# Patient Record
Sex: FEMALE | Race: WHITE | NOT HISPANIC OR LATINO | Employment: STUDENT | URBAN - METROPOLITAN AREA
[De-identification: names, ages, dates, MRNs, and addresses within clinical notes are randomized per-mention and may not be internally consistent; named-entity substitution may affect disease eponyms.]

---
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
## 2025-01-03 ENCOUNTER — TREATMENT (OUTPATIENT)
Dept: PHYSICAL THERAPY | Facility: HOSPITAL | Age: 20
End: 2025-01-03
Payer: COMMERCIAL

## 2025-01-03 DIAGNOSIS — M25.851 FEMOROACETABULAR IMPINGEMENT OF BOTH HIPS: ICD-10-CM

## 2025-01-03 DIAGNOSIS — Z47.89 ORTHOPEDIC AFTERCARE: ICD-10-CM

## 2025-01-03 DIAGNOSIS — M25.852 FEMOROACETABULAR IMPINGEMENT OF BOTH HIPS: ICD-10-CM

## 2025-01-03 DIAGNOSIS — M25.552 LEFT HIP PAIN: ICD-10-CM

## 2025-01-03 PROCEDURE — 97110 THERAPEUTIC EXERCISES: CPT | Mod: GP

## 2025-01-03 PROCEDURE — 97140 MANUAL THERAPY 1/> REGIONS: CPT | Mod: GP

## 2025-01-03 NOTE — PROGRESS NOTES
"  Physical Therapy  Physical Therapy Treatment Note    Patient Name: Ashley Fisher  MRN: [id number]  Today's Date: 1/3/2025  Time Calculation  Start Time: 1225  Stop Time: 1335  Time Calculation (min): 70 min    Insurance:  Visit number: 2 of MN (8 in 2024)   Authorization info: no auth needed  Insurance Type: [place]- no vaso    General:  Reason for visit: s/p L Acetabular labral debridement only as the labrum was not unstable thus did not require fixation formally , Intra-articular loose body removal, Femoral osteochondroplasty for CAM lesion, Capsular plication on 11/26/24.   Referred by: Dr. Moran  School: [employer]   Sport:  Running recreationally   POW: 5    Current Problem  1. Femoroacetabular impingement of both hips  Follow Up In Physical Therapy      2. Left hip pain  Follow Up In Physical Therapy      3. Orthopedic aftercare  Follow Up In Physical Therapy            Precautions: No active ER> 20 degrees x 3 weeks and Avoid hip flexion isometrics x 4 weeks, 50% WB until follow up, brace x 2 weeks       Subjective:     Patient reports hip is feeling good- no complaints entering clinic.     Pain   0/10     Performing HEP?: Yes      Objective:   Amb into clinic carrying her crutch -has not been using it at home.      ROM     Hip PROM (Degrees) 12/12/24                             (R)                    (L)  Flexion:            90                     130           Extension:        neutral             neutral   Abduction:       40                     40 deg  Adduction:       NT                    N   ER:                    50                     50  IR:                     32                     45    Treatment Performed:    Therapeutic Exercise:    40 min  Upright bike x 5 minutes  TA with biofeedback 10 x5\" -NT  Bridge with hip abd 2 x 10 5\"   BKFO 2 x 10 with biofeedback -no biofeedback today  Quad rock backs 2x15-hep  Prone ER/IR RTB 2 x 10 5\" holds  Glute med hip abd at wall 2 x 6 5\" hold " "  CKC OTB hip flexion/abd/ext 2 x 10   Clamshell 3 x 8 RTB  Prone plank 3 x 30\"  Side plank 3 x 20\"   NT  Side stepping 3 x 5 steps   1/2 kneeling hip flexor stretch 3 x 30\"   Prone ER/IR 2 x 10   S/L hip abduction 3 x 8   S/l hip extension 3 x 8   Prone quad stretch with 1/2 bolster 3 x 30\"  SLS 10 x 10\" L LE         Manual Therapy:    20 min  PROM L hip   Belted MWM IR, LAD   Hip scooping mob  STM/DTM L iliacus, rectus femoris, adductors, gluts, TFL     Neuromuscular Re-education:  5 min-NT  Attempted biofeedback but machine was malfunctioning. From reps completed- pt unable to active her surgical glute max as well as non surgical     Other:      UNBILLED  ThermX 34 degrees low compression in prone x 10 mins     PT Therapeutic Procedures Time Entry  Manual Therapy Time Entry: 20  Therapeutic Exercise Time Entry: 40              Non-Billable Time  Non-billable time: 10  Non-billable time reason: ice   Assessment:   Patient able to progress muscle activation and strengthening exercises today during session without increased pain. Focused on deep stabilizer activation and WB glute med activation. Mirror feedback utilized to prevent lateral trunk lean with standing hip abduction when weight bearing on surgical limb. No gait deviations noted today in clinic.       Plan:  Continue progressing through protocol.     Shannon Gaines, PT  "

## 2025-01-06 ENCOUNTER — TREATMENT (OUTPATIENT)
Dept: PHYSICAL THERAPY | Facility: HOSPITAL | Age: 20
End: 2025-01-06
Payer: COMMERCIAL

## 2025-01-06 ENCOUNTER — APPOINTMENT (OUTPATIENT)
Dept: ORTHOPEDIC SURGERY | Facility: HOSPITAL | Age: 20
End: 2025-01-06
Payer: COMMERCIAL

## 2025-01-06 ENCOUNTER — APPOINTMENT (OUTPATIENT)
Dept: PEDIATRICS | Facility: CLINIC | Age: 20
End: 2025-01-06
Payer: COMMERCIAL

## 2025-01-06 ENCOUNTER — OFFICE VISIT (OUTPATIENT)
Dept: ORTHOPEDIC SURGERY | Facility: HOSPITAL | Age: 20
End: 2025-01-06
Payer: COMMERCIAL

## 2025-01-06 DIAGNOSIS — M25.552 LEFT HIP PAIN: ICD-10-CM

## 2025-01-06 DIAGNOSIS — S73.192D ACETABULAR LABRUM TEAR, LEFT, SUBSEQUENT ENCOUNTER: Primary | ICD-10-CM

## 2025-01-06 DIAGNOSIS — Z47.89 ORTHOPEDIC AFTERCARE: ICD-10-CM

## 2025-01-06 DIAGNOSIS — M25.851 FEMOROACETABULAR IMPINGEMENT OF BOTH HIPS: ICD-10-CM

## 2025-01-06 DIAGNOSIS — M25.852 FEMOROACETABULAR IMPINGEMENT OF BOTH HIPS: ICD-10-CM

## 2025-01-06 PROCEDURE — 99211 OFF/OP EST MAY X REQ PHY/QHP: CPT | Performed by: SPECIALIST/TECHNOLOGIST

## 2025-01-06 PROCEDURE — 1036F TOBACCO NON-USER: CPT | Performed by: SPECIALIST/TECHNOLOGIST

## 2025-01-06 PROCEDURE — 97110 THERAPEUTIC EXERCISES: CPT | Mod: GP

## 2025-01-06 PROCEDURE — 97140 MANUAL THERAPY 1/> REGIONS: CPT | Mod: GP

## 2025-01-06 NOTE — PROGRESS NOTES
Physical Therapy  Physical Therapy Treatment Note    Patient Name: Ashley Fisher  MRN: [id number]  Today's Date: 1/6/2025  Time Calculation  Start Time: 0955  Stop Time: 1110  Time Calculation (min): 75 min    Insurance:  Visit number: 3 of MN (8 in 2024)   Authorization info: no auth needed  Insurance Type: [place]- No Vaso    General:  Reason for visit: s/p L Acetabular labral debridement only as the labrum was not unstable thus did not require fixation formally , Intra-articular loose body removal, Femoral osteochondroplasty for CAM lesion, Capsular plication on 11/26/24.   Referred by: Dr. Moran  School: [employer]   Sport:  Running recreationally   POW: 5    Current Problem  1. Femoroacetabular impingement of both hips  Follow Up In Physical Therapy      2. Left hip pain  Follow Up In Physical Therapy      3. Orthopedic aftercare  Follow Up In Physical Therapy          Precautions: No active ER> 20 degrees x 3 weeks and Avoid hip flexion isometrics x 4 weeks, 50% WB until follow up, brace x 2 weeks       Subjective:     Patient reports she slipped in the snow yesterday and felt a pull in her L hip- caused muscle spasms last night and is a bit sore today.     Pain   4/10     Performing HEP?: Yes      Objective:   Amb into clinic carrying her crutch -has not been using it at home.      ROM     Hip PROM (Degrees) 12/12/24                             (R)                    (L)  Flexion:            90                     130           Extension:        neutral             neutral   Abduction:       40                     35 deg  Adduction:       NT                    N   ER:                    50                     50  IR:                     32                     29 entering clinic, 40 deg after manual     Treatment Performed:    Therapeutic Exercise:    35 min  Upright bike x 5 minutes  Bridge with ER 5 x 10   SL bridge 2 x 8   Side stepping and monster walks OTB 2 x 10 yards   Jump  level 4  "3 x 10 one yellow  BKFO 2 x 10 with biofeedback -no biofeedback today  Prone ER/IR RTB 2 x 10 5\" holds  Glute med hip abd at wall 2 x 6 5\" hold   CKC OTB hip flexion/abd/ext 2 x 10   Clamshell 3 x 8 RTB  SLS with mini squat band medial and lateral knee 2 x 5 each   Prone plank 3 x 30\"  1/2 kneeling hip flexor stretch 3 x 30\"     NT  TA with biofeedback 10 x5\" -NT  Quad rock backs 2x15-hep  Prone ER/IR 2 x 10   Side plank 3 x 20\" _NT  S/L hip abduction 3 x 8   S/l hip extension 3 x 8   Prone quad stretch with 1/2 bolster 3 x 30\"  SLS 10 x 10\" L LE         Manual Therapy:    25 min  Belted MWM IR, caudal, lateral    Hip scooping mob  STM/DTM L iliacus, rectus femoris, adductors, gluts, TFL     Neuromuscular Re-education:  5 min-NT  Attempted biofeedback but machine was malfunctioning. From reps completed- pt unable to active her surgical glute max as well as non surgical     Other:      UNBILLED  ThermX 34 degrees low compression in prone x 10 mins     PT Therapeutic Procedures Time Entry  Manual Therapy Time Entry: 25  Therapeutic Exercise Time Entry: 35              Non-Billable Time  Non-billable time: 15  Non-billable time reason: ice   Assessment:   Increased tone noted in her L adductors and proximal rectus today, increased time spent on manual therapy which reduced tone and symptoms in hip. The focus of today's session was strengthening and flexibility/ROM . Patient appropriately challenged by therapeutic exercise and was able to complete without increased pain. The patient is still limited in overall strength, flexibility, motor control and pain  at this time. Patient progressing well overall with therapy and continues to require skilled care to address motor control, strength, flexibility and functional deficits.         Plan:  Continue progressing through protocol. Pt last session is Wednesday due to returning to school in [place]- will update HEP and provide with protocol.     Shannon Gaines, PT  "

## 2025-01-07 NOTE — PROGRESS NOTES
The patient returns, with her mother, 6 weeks out from their Left hip arthroscopy on 11/26/2024.  Overall, she is doing fine.  Denies adverse events or issues since her last visit.  She states that her hip fatigues quickly with prolonged walking.  She denies pain.  She continues with formal physical therapy and is progressing nicely.  Her range of motion is progressing well.  She leaves for school this Thursday in [facility] [facility] [place].      She is doing quite well 6 weeks out from her left hip arthroscopy.  Her range of motion is symmetric.  She denies pain.  She is arranging physical therapy while at school.  We highly encouraged her physical therapist here speaking with the physical therapist near school.  I encouraged her to continue icing her hip 15 to 20 minutes at a time 2-3 times per day.  Encouraged her to continue with her home exercise program and stretching routine over the first 6 weeks as she transitions to more strengthening.  She will follow-up in 6 weeks virtually.  They are in agreement the plan.  Their questions are answered.      Dr. Francesco Moran met with and examined the patient as well and formulated the treatment plan.      Mendoza Palacio PA-C

## 2025-01-08 ENCOUNTER — APPOINTMENT (OUTPATIENT)
Dept: PEDIATRICS | Facility: CLINIC | Age: 20
End: 2025-01-08
Payer: COMMERCIAL

## 2025-01-08 ENCOUNTER — TREATMENT (OUTPATIENT)
Dept: PHYSICAL THERAPY | Facility: HOSPITAL | Age: 20
End: 2025-01-08
Payer: COMMERCIAL

## 2025-01-08 VITALS — TEMPERATURE: 98.8 F | WEIGHT: 154 LBS | BODY MASS INDEX: 22.74 KG/M2

## 2025-01-08 DIAGNOSIS — M25.852 FEMOROACETABULAR IMPINGEMENT OF BOTH HIPS: ICD-10-CM

## 2025-01-08 DIAGNOSIS — M25.552 LEFT HIP PAIN: ICD-10-CM

## 2025-01-08 DIAGNOSIS — D50.9 IRON DEFICIENCY ANEMIA, UNSPECIFIED IRON DEFICIENCY ANEMIA TYPE: Primary | ICD-10-CM

## 2025-01-08 DIAGNOSIS — Z47.89 ORTHOPEDIC AFTERCARE: ICD-10-CM

## 2025-01-08 DIAGNOSIS — M25.851 FEMOROACETABULAR IMPINGEMENT OF BOTH HIPS: ICD-10-CM

## 2025-01-08 PROCEDURE — 99213 OFFICE O/P EST LOW 20 MIN: CPT | Performed by: PEDIATRICS

## 2025-01-08 PROCEDURE — 97110 THERAPEUTIC EXERCISES: CPT | Mod: GP

## 2025-01-08 PROCEDURE — 97140 MANUAL THERAPY 1/> REGIONS: CPT | Mod: GP

## 2025-01-08 PROCEDURE — G2211 COMPLEX E/M VISIT ADD ON: HCPCS | Performed by: PEDIATRICS

## 2025-01-08 NOTE — PATIENT INSTRUCTIONS
When taking iron supplements, avoid delay/slow release formulations and instead take Ferrous gluconate or Ferrous Fumarate which are available over the counter.    Take one pill daily for 2 months, then change to a daily women's multivitamin.    Suggestions for taking iron:     Avoid enteric coated or timed-release products  Take separately from foods that may impair absorption  Meals  Coffee  Eggs  Oxalates (spinach, kale, beets, nuts, chocolate, tea, wheat bran, rhubarb, strawberries, some herbs)  Phytates (soy, fiber, cereals, some nuts, beans, lentils, peas)  Tannates (tea, cocoa, some spices, some berries)  Calcium (milk, yogurt, cheese, some greens, fish)  Minimize exposure to medications that decrease gastric acidity  Antacids (take iron 2 hours before or 4 hours after the antacid)  Histamine receptor blockers (discontinue if no longer needed)  Proton pump inhibitors (discontinue if no longer needed)  Take with foods/supplements that may increase absorption  Vitamin C  Orange juice   Improve tolerability*      Change from every-day to every-other-day dosing  Dietary modifications (eg, take with food)?  Take a product with less elemental iron?  Use a stool softener or laxative

## 2025-01-08 NOTE — PROGRESS NOTES
Physical Therapy  Physical Therapy Treatment Note    Patient Name: Ashley Fisher  MRN: [id number]  Today's Date: 1/8/2025  Time Calculation  Start Time: 0950  Stop Time: 1105  Time Calculation (min): 75 min    Insurance:  Visit number: 4 of MN (8 in 2024)   Authorization info: no auth needed  Insurance Type: [place]- No Vaso    General:  Reason for visit: s/p L Acetabular labral debridement only as the labrum was not unstable thus did not require fixation formally , Intra-articular loose body removal, Femoral osteochondroplasty for CAM lesion, Capsular plication on 11/26/24.   Referred by: Dr. Moran  School: [employer]   Sport:  Running recreationally   POW: 6    Current Problem  1. Femoroacetabular impingement of both hips  Follow Up In Physical Therapy      2. Left hip pain  Follow Up In Physical Therapy      3. Orthopedic aftercare  Follow Up In Physical Therapy          Precautions: No active ER> 20 degrees x 3 weeks and Avoid hip flexion isometrics x 4 weeks, 50% WB until follow up, brace x 2 weeks       Subjective:     Patient reports MD visit went well. Has PT set up at school for next Tuesday evaluation. Will be in contact with PT regarding her care. Pt reports her hip is feeling much better today from previous session.     Pain   0/10     Performing HEP?: Yes      Objective:   Amb into clinic carrying her crutch -has not been using it.      ROM     Hip PROM (Degrees) 12/12/24                             (R)                    (L)  Flexion:            90                     135           Extension:        neutral             neutral   Abduction:       40                     40 deg  Adduction:       NT                    N   ER:                    50                     50  IR:                     32                     42    Treatment Performed:    Therapeutic Exercise:    45 min  Upright bike x 5 minutes  Bridge with ER 5 x 10   SL bridge 3 x 8   Standing clamshells RTB 2 x 10   S/l hip add/abd  "2 x 10   Side stepping and monster walks OTB 2 x 10 yards   Jump  level 4 3 x 10 one yellow  Heel taps 6\" 3 x 10   Birddogs 2 x 10 5\" hold   Prone plank 3 x 30\"  Side plank 3 x 15 \"   BKFO 2 x 10 with biofeedback -no biofeedback today  Prone ER/IR RTB 2 x 10 5\" holds  Glute med hip abd at wall 2 x 6 5\" hold   CKC OTB hip flexion/abd/ext 2 x 10 -NT  SLS with mini squat band medial and lateral knee 2 x 5 each   1/2 kneeling hip flexor stretch 3 x 30\"     NT  TA with biofeedback 10 x5\" -NT  Quad rock backs 2x15-hep  Prone ER/IR 2 x 10   Side plank 3 x 20\" _NT  S/L hip abduction 3 x 8   S/l hip extension 3 x 8   Prone quad stretch with 1/2 bolster 3 x 30\"  SLS 10 x 10\" L LE         Manual Therapy:    15 min  Belted MWM IR, caudal, lateral    STM/DTM L iliacus, rectus femoris, adductors, gluts, TFL     Neuromuscular Re-education:  5 min-NT  Attempted biofeedback but machine was malfunctioning. From reps completed- pt unable to active her surgical glute max as well as non surgical     Other:      UNBILLED  ThermX 34 degrees low compression in prone x 10 mins     PT Therapeutic Procedures Time Entry  Manual Therapy Time Entry: 15  Therapeutic Exercise Time Entry: 45              Non-Billable Time  Non-billable time: 10  Non-billable time reason: ice   Assessment:   Updated patients home exercise program for her as she returns to school tomorrow. Pt demonstrated understanding of all exercises for home program and is already set up for PT. Will reach out to discuss with them regarding Dr. Johnson protocol. Patient doing very well for 6 weeks out of surgery and anticipate she will continue to do well with guided therapy.       Plan:  Discharge as patient is returning to school.     Shannon Gaines, PT  "

## 2025-01-08 NOTE — PROGRESS NOTES
Ashley Fisher is a 19 y.o. female who presents for Labs Only.    KATHY Ramirez is here today to discuss issues with her labs done routinely for her hip surgery 6 weeks ago.  She was noted to hae a slight decrease in her hemoglobin at that time.  About 6 months ago her hgb was normal, but her Ferritin was 17 which is at the low end of normal.  Menstrual periods are regular.  Not on birth control.  No fatigue or other symptoms.  She is in school in [place] -- [employer].    Objective   Temp 37.1 °C (98.8 °F)   Wt 69.9 kg (154 lb)   BMI 22.74 kg/m²     Physical Exam  Gen: well appearing    Assessment/Plan   Ashley has mild anemia on her last cbc which I suspect is from iron deficiency.  She is asymptomatic.  I recommended she start on daily iron (info given) for 2 months.  Because she will be at school until May she will continue on a daily women's MVT after that until she comes home and gets her cbc/ferritin re-drawn (order now in Epic).  She will call sooner with any concerns.

## 2025-01-10 DIAGNOSIS — R09.81 NASAL CONGESTION: ICD-10-CM

## 2025-01-10 RX ORDER — FLUTICASONE PROPIONATE 50 MCG
2 SPRAY, SUSPENSION (ML) NASAL DAILY PRN
Qty: 48 ML | Refills: 1 | Status: SHIPPED | OUTPATIENT
Start: 2025-01-10

## 2025-01-10 NOTE — TELEPHONE ENCOUNTER
Bed: [facility] 02 - A Chair  Expected date: 11/19/24  Expected time:   Means of arrival:   Comments:   Sent from pharm, last [facility] 6/14/24

## 2025-02-17 ENCOUNTER — OFFICE VISIT (OUTPATIENT)
Dept: ORTHOPEDIC SURGERY | Facility: HOSPITAL | Age: 20
End: 2025-02-17
Payer: COMMERCIAL

## 2025-02-17 DIAGNOSIS — S73.192D ACETABULAR LABRUM TEAR, LEFT, SUBSEQUENT ENCOUNTER: Primary | ICD-10-CM

## 2025-02-17 PROCEDURE — 99211 OFF/OP EST MAY X REQ PHY/QHP: CPT | Performed by: SPECIALIST/TECHNOLOGIST

## 2025-02-17 PROCEDURE — 1036F TOBACCO NON-USER: CPT | Performed by: SPECIALIST/TECHNOLOGIST

## 2025-02-17 NOTE — PROGRESS NOTES
The patient returns, via phone, 12 weeks out from their Left hip arthroscopy on 11/26/2024.  Overall she is doing well.  Denies problems or issues since her last visit.  She continues with formal physical therapy while at school twice weekly and has not had any difficulties.  She has not began her jumping or running program to this point and has been focusing solely on strengthening and continued flexibility.  She denies pain with her day-to-day activities.    The patient and I discussed her hip 12 weeks status post left hip arthroscopy.  Per her report she is doing quite well.  She denies pain.  She denies issues.  I feel at this point that it is okay for her to begin her plyometric and walk jog program portion of the postoperative rehab.  She asked if she is ready to swim in, and I feel at this point she is not ready to do so until after she has passed her return to sport test.  She will have her physical therapist at school reach out to her physical therapist here in [place] for the return to sport test.  She will follow-up at the beginning of May when she returns back home from the semester.  She is in agreement the plan.  Questions are answered.      Mendoza Palacio PA-C

## 2025-02-17 NOTE — ANESTHESIA PROCEDURE NOTES
Airway  Date/Time: 11/26/2024 7:19 AM  Urgency: elective    Airway not difficult    Staffing  Performed: SRNA   Authorized by: Kevin Wellington MD    Performed by: ANAY Kimbrough-DEMARCUS  Patient location during procedure: OR    Indications and Patient Condition  Indications for airway management: anesthesia  Spontaneous ventilation: present  Sedation level: deep  Preoxygenated: yes  Patient position: sniffing  Mask difficulty assessment: 1 - vent by mask    Final Airway Details  Final airway type: endotracheal airway      Successful airway: ETT  Cuffed: yes   Successful intubation technique: direct laryngoscopy  Blade: Jose  Blade size: #3  ETT size (mm): 7.0  Cormack-Lehane Classification: grade I - full view of glottis  Placement verified by: capnometry   Measured from: lips  ETT to lips (cm): 22  Number of attempts at approach: 1           Female

## 2025-05-09 ENCOUNTER — OFFICE VISIT (OUTPATIENT)
Dept: ORTHOPEDIC SURGERY | Facility: HOSPITAL | Age: 20
End: 2025-05-09
Payer: COMMERCIAL

## 2025-05-09 ENCOUNTER — HOSPITAL ENCOUNTER (OUTPATIENT)
Dept: RADIOLOGY | Facility: HOSPITAL | Age: 20
Discharge: HOME | End: 2025-05-09
Payer: COMMERCIAL

## 2025-05-09 DIAGNOSIS — M25.551 RIGHT HIP PAIN: ICD-10-CM

## 2025-05-09 DIAGNOSIS — M25.851 FEMOROACETABULAR IMPINGEMENT OF RIGHT HIP: Primary | ICD-10-CM

## 2025-05-09 DIAGNOSIS — S73.192D ACETABULAR LABRUM TEAR, LEFT, SUBSEQUENT ENCOUNTER: ICD-10-CM

## 2025-05-09 PROCEDURE — 73502 X-RAY EXAM HIP UNI 2-3 VIEWS: CPT | Mod: RT

## 2025-05-09 PROCEDURE — 73502 X-RAY EXAM HIP UNI 2-3 VIEWS: CPT | Mod: RIGHT SIDE | Performed by: RADIOLOGY

## 2025-05-09 PROCEDURE — 99214 OFFICE O/P EST MOD 30 MIN: CPT | Performed by: SPECIALIST/TECHNOLOGIST

## 2025-05-09 PROCEDURE — 1036F TOBACCO NON-USER: CPT | Performed by: SPECIALIST/TECHNOLOGIST

## 2025-05-09 RX ORDER — MELOXICAM 15 MG/1
15 TABLET ORAL DAILY
Qty: 14 TABLET | Refills: 0 | Status: SHIPPED | OUTPATIENT
Start: 2025-05-09 | End: 2025-05-23

## 2025-05-09 NOTE — PROGRESS NOTES
HPI  This is a pleasant 20 y.o. female here today, with her mother, for a 6-month follow-up for her left hip and new patient evaluation for right hip pain.  She states the pain has been present for approximately 1 year at her initial onset of her symptoms.  She had bilateral hip pain at her initial visit with Dr. Moran and her left was worse.  She underwent a successful left hip arthroscopy.  She denies pain at her LEFT hip today.  Formal physical therapy is complete in regards to her left hip.  She has returned back to all of her activities with minimal difficulties on her left hip.  She has done some sand volleyball with minimal issues while at school.    Today, for the RIGHT hip she states that she has an achy sensation over the anterior and lateral hip that worsens with walking and running.  She denies a specific injury or trauma.  She has been using over-the-counter analgesia as needed.  She denies prior right hip injuries.  She does report some tingling down the lateral side of her leg to her foot occasionally with walking.  She presents for continued treatment recommendations.        Medical History[1]    Surgical History[2]    Social History[3]       ROS  Review of systems reviewed and pertinent positives mentioned in HPI.      PHYSICAL EXAM    RIGHT HIP:  There is not  pain with a resisted situp.    There is not abdominal distention or tenderness    The patient's range of motion reveals that they have 90° of hip flexion on the affected side.   ER 50 degrees.   IR 25 degrees  Hip extension to 10°   Abduction to 45°      The patient is 5 out of 5 strength with resisted hip AB, adduction, hamstring and quadriceps testing.    No pain over the hip flexor, ASIS.  No pain over the proximal hamstring, piriformis      Pain Provacation testing:    Positive impingement sign,with a painful arc from 12 to 3:00.  Negative Psoas impingement/Consuelo test  Negative instability, Log roll.  Positive subspine impingement test,  which is pain with straight hip flexion.    Negative straight leg raise.  Negative circumduction clunk.      Peritrochanteric space examination:  Tenderness over the joe-trochanteric space - No  pain over the posterior trochanter - No      IMAGING  X-rays reviewed reveal no gross fracture or dislocation. and evidence of femoral acetabular impingement with an alpha angle of 68.8.  Acetabular index of 3.9  without acetabular retroversion.  Lateral center edge of 30.8.    MRI reviewed reveals No MRI available for review      ASSESSMENT/PLAN  This is a 20 y.o. female patient here today with significant hip pain secondary to Right femoral acetabular impingement and 6 months status post left hip arthroscopy    In regards to her LEFT hip: She is doing quite well.  She denies pain.  Her range of motion is symmetric compared to her nonoperative right hip.  Her strength is symmetric bilaterally.  She denies issues on her left side with activities.  Formal physical therapy is complete.  We discussed obtaining the documentation and records from her physical therapy location near school so we can have this in her chart.  She is able to participate in all activities as she can tolerate on her left side.    In regards to her RIGHT hip: She is starting to have similar symptoms to her left prior to her hip arthroscopy.  We had a lengthy discussion with the patient and her mother regarding her clinical presentation.  She has anatomy similar to her left side.  We we will begin a course of conservative treatment.  We agreed upon meloxicam 50 mg taken once daily with food for the next 14 days.  She was provided with a referral to physical therapy.  She will follow-up on 6/6/2025 with Dr. Moran for clinical recheck.  She is in agreement the plan.  Questions are answered.    Mendoza Palacio PA-C       [1]   Past Medical History:  Diagnosis Date    Acute pharyngitis, unspecified 02/25/2017    Sore throat    Cough 06/20/2023    Dry eye  syndrome 06/20/2023    Dry eyes 06/20/2023    Finger injury, initial encounter 06/20/2023    Influenza 06/20/2023    Keratoconjunctivitis 06/20/2023   [2]   Past Surgical History:  Procedure Laterality Date    CHALAZION EXCISION      OTHER SURGICAL HISTORY  06/05/2020    No history of surgery    WISDOM TOOTH EXTRACTION     [3]   Social History  Tobacco Use    Smoking status: Never    Smokeless tobacco: Never   Vaping Use    Vaping status: Never Used   Substance Use Topics    Alcohol use: Not Currently    Drug use: Never

## 2025-05-20 ENCOUNTER — EVALUATION (OUTPATIENT)
Dept: PHYSICAL THERAPY | Facility: HOSPITAL | Age: 20
End: 2025-05-20
Payer: COMMERCIAL

## 2025-05-20 DIAGNOSIS — M25.551 RIGHT HIP PAIN: ICD-10-CM

## 2025-05-20 DIAGNOSIS — M25.851 FEMOROACETABULAR IMPINGEMENT OF RIGHT HIP: ICD-10-CM

## 2025-05-20 DIAGNOSIS — R29.898 WEAKNESS OF RIGHT HIP: Primary | ICD-10-CM

## 2025-05-20 PROCEDURE — 97161 PT EVAL LOW COMPLEX 20 MIN: CPT | Mod: GP | Performed by: PHYSICAL THERAPIST

## 2025-05-20 PROCEDURE — 97110 THERAPEUTIC EXERCISES: CPT | Mod: GP | Performed by: PHYSICAL THERAPIST

## 2025-05-20 ASSESSMENT — PAIN SCALES - GENERAL: PAINLEVEL_OUTOF10: 7

## 2025-05-20 NOTE — PROGRESS NOTES
Physical Therapy  Physical Therapy Orthopedic Evaluation    Patient Name: Ashley Fisher  MRN: [id number]  Today's Date: 5/20/2025  Time Calculation  Start Time: 1100  Stop Time: 1153  Time Calculation (min): 53 min    Insurance:  Visit number: 1 of 100  Authorization info: no auth  Insurance Type: [place] (no Vaso)    General:  Reason for visit: right hip pain  Referred by: Dr. Moran  School: [employer]  Sport: volleyball     Current Problem  1. Weakness of right hip        2. Femoroacetabular impingement of right hip  Referral to Physical Therapy      3. Right hip pain            Precautions: none  Precautions  STEADI Fall Risk Score (The score of 4 or more indicates an increased risk of falling): 0    Medical History Form: Reviewed (scanned into chart)    Subjective:     Chief Complaint: Patient presents to clinic with a chief complaint of right hip pain. Pt has a past medical hx of left hip labral repair in 11/2024. Pt completed her rehab and condition improved. Pt noticed recently she has been having right hip pain. Pt consulted Dr. Moran and has similar structure as left hip prior to sx. Pt states at times she has tingling that goes into foot. Pt denies back pain  Onset Date: fall of 2023   MIHIR: Chronic    Current Condition:   Worse    Pain:  0-10 (Numeric) Pain Score: 7  Pain Type: Chronic pain  Pain Location: Hip  Pain Orientation: Right, Anterior  Location: anterior hip  Description: deep ache  Aggravating Factors: Standing and Running  Relieving Factors:  Heat    Relevant Information (PMH & Previous Tests/Imaging): x-rays  Previous Interventions/Treatments: Physical Therapy    Prior Level of Function (PLOF)  Patient previously independent with all ADLs  Exercise/Physical Activity:  volleyball  Work/School: [employer]    Patients Living Environment: Reviewed and no concern    Primary Language: English    There are no spiritual/cultural practices/values/needs that are  important to know    Patient's Goal(s) for Therapy: to allevaite    Red Flags: Do you have any of the following? No  Fever/chills, unexplained weight changes, dizziness/fainting, unexplained change in bowel or bladder functions, unexplained malaise or muscle weakness, night pain/sweats, numbness or tingling    Objective:  Objective       ROM    Lumbar AROM (%)    Flexion: 0% impaired - pain  Extension:  0% impaired - pain  (L) Side Bend:  0% impaired - pain  (R) Side Bend:  0% impaired - pain  (L) Rotation:  0% impaired - pain  (R) Rotation:  0% impaired - pain      Hip AROM (Degrees)      (R)  (L)  Flexion: 122*  125     Extension: 20  20    Abduction: 45  45    Adduction: NT  NT    ER:  50  40    IR:  70!  40    !=prone    Hip PROM (Degrees)      (R)  (L)  Flexion: 125  125     Extension: 20  20    Abduction: 45  45    Adduction: NT  NT    ER:  50  45    IR:  45  45            Strength Testing      Hip      (R)  (L)  Flexion: 4/5*  5/5     Extension: 4/5  5/5    Abduction: 4/5  5/5     ER:  4/5  5/5    IR:  4/5  5/5          Functional Screening  Squat: full - pain  SL Quarter Squat: dynamic knee valgus with contralateral hip drop right    Joint Mobility: hypermobility IR right          Special Tests    Hip Impingement Test: + right  Trendelenburg Test: +      Outcome Measures:  Other Measures  Lower Extremity Funtional Score (LEFS): 62     EDUCATION: home exercise program, plan of care, activity modifications, pain management, and injury pathology       Goals: Set and discussed today  Active       PT Problem       PT Goal 1       Start:  05/20/25    Expected End:  08/20/25         LTG 8-12 weeks R hip strength: flexion, abduction, ER/IR, extension, glute max  >4+ to 5/5 MMT or 80% to demonstrate pelvic stability during ADLs and higher level functional tasks   Patient able to perform SL squat without valgus or anterior hip impingement to demonstrate LE biomechanics by 6- 8 weeks   LEFS > 30/80 to demonstrate  improved ability to perform ADLs by 6 weeks    Return to Volleyball pain free               Plan of care was developed with input and agreement by the patient      Treatment Performed:    Therapeutic Exercise:    30 min  Access Code: [email or web address]  URL: [email or web address]  Date: 05/20/2025  Prepared by: Jn Henriquez    Exercises  - Supine Bridge with Mini Swiss Ball Between Knees  - 2 x daily - 7 x weekly - 3 sets - 10 reps  - Modified Side Plank with Hip Abduction  - 2 x daily - 7 x weekly - 3 sets - 10 reps  - Bird Dog with Resistance  - 2 x daily - 7 x weekly - 2 sets - 12 reps  - Quadruped Hip Abduction with Resistance Loop  - 2 x daily - 7 x weekly - 3 sets - 15 reps    Manual Therapy:     min      Neuromuscular Re-education:   min      Other:      min          Assessment: Patient presents with signs and symptoms consistent with mechanical right hip pain secondary to MELINA, resulting in limited participation in pain-free ADLs and inability to perform at their prior level of function. Pt would benefit from physical therapy to address the impairments found & listed previously in the objective section in order to return to safe and pain-free ADLs and prior level of function.       Clinical Presentation: Stable and/or uncomplicated characteristics    Plan:     Planned Interventions include: therapeutic exercise, self-care home management, manual therapy, therapeutic activities, gait training, neuromuscular coordination, vasopneumatic, dry needling, aquatic therapy  Frequency: 2 x Week  Duration: 8 Weeks  Rehab Potential/Prognosis: Good      Jn Henriquez, PT

## 2025-05-22 ENCOUNTER — TREATMENT (OUTPATIENT)
Dept: PHYSICAL THERAPY | Facility: HOSPITAL | Age: 20
End: 2025-05-22
Payer: COMMERCIAL

## 2025-05-22 DIAGNOSIS — M25.852 FEMOROACETABULAR IMPINGEMENT OF BOTH HIPS: ICD-10-CM

## 2025-05-22 DIAGNOSIS — M25.552 LEFT HIP PAIN: ICD-10-CM

## 2025-05-22 DIAGNOSIS — M25.851 FEMOROACETABULAR IMPINGEMENT OF BOTH HIPS: ICD-10-CM

## 2025-05-22 DIAGNOSIS — Z47.89 ORTHOPEDIC AFTERCARE: ICD-10-CM

## 2025-05-22 PROCEDURE — 97110 THERAPEUTIC EXERCISES: CPT | Mod: GP | Performed by: PHYSICAL THERAPIST

## 2025-05-22 ASSESSMENT — PAIN SCALES - GENERAL: PAINLEVEL_OUTOF10: 0 - NO PAIN

## 2025-05-22 ASSESSMENT — PAIN - FUNCTIONAL ASSESSMENT: PAIN_FUNCTIONAL_ASSESSMENT: 0-10

## 2025-05-22 NOTE — PROGRESS NOTES
Physical Therapy  Physical Therapy Treatment Note    Patient Name: Ashley Fisher  MRN: [id number]  Today's Date: 5/22/2025  Time Calculation  Start Time: 0800  Stop Time: 0918  Time Calculation (min): 78 min    Insurance:  Visit number: 2 of 100  Authorization info: no auth  Insurance Type: [place] (no vaso)    General:  Reason for visit: right hip pain  Referred by: Dr. Moran  School: [employer]  Sport: volleyball     Current Problem  1. Femoroacetabular impingement of both hips  Follow Up In Physical Therapy      2. Left hip pain  Follow Up In Physical Therapy      3. Orthopedic aftercare  Follow Up In Physical Therapy          Precautions: none      Subjective:     Patient reports she is doing well overall. Pt states she does not have pain but is sore from her HEP    Pain  Pain Assessment: 0-10  0-10 (Numeric) Pain Score: 0 - No pain    Performing HEP?: Yes      Objective:   Hip AROM (Degrees)                             (R)                    (L)  Flexion:            122*                 125                                 Extension:        20                     20                       Abduction:       45                     45                       Adduction:       NT                    NT                      ER:                    50                     40                       IR:                     70!                   40                       !=prone     Hip PROM (Degrees)                             (R)                    (L)  Flexion:            125                   125                                 Extension:        20                     20                       Abduction:       45                     45                       Adduction:       NT                    NT                      ER:                    50                     45                       IR:                     45                     45                                                        Strength Testing       "  Hip                             (R)                    (L)  Flexion:            4/5*                  5/5                                 Extension:        4/5 5/5                     Abduction:       4/5 5/5                                 ER:                    4/5 5/5                     IR:                     4/5 5/5                            Treatment Performed:    Therapeutic Exercise:    68 min  Upright bike x6'  Resisted side stepping with red TB loop 5 yards x3 each  SL fire hydrant on bench red TB loop 2x12 each   SL bridge from 12\" box 3x10 each  PB Dead bugs 2x20  Cross over RDL pink KB 2x10 each  High plank hip flexor sliders 1x10 (popping)  Hip flexor isometrics in 90/90 position 15\"on/30\" off x5  Seated resisted hip IR orange TB loop with yoga block 2x15  4\" hip hike 3x12    Manual Therapy:     min      Neuromuscular Re-education:   min      Other:      min  Ice x10      Assessment:   The focus of the session was strength, motor control, and stability work. The pt demonstrated good tolerance to the noted exercises today by reporting she was challneged but denied pain throughout. The pt is demonstrated good progress in skilled rehab at this time. The pt is still limited in overall strength, motor control, balance, and pain at this time. The pt continues to be a good candidate for skilled PT, in order to further improve strength, motor control, gait mechanics, and pain.       Plan:  Continue to progress glute strength. Continue wit core strength and rotational stability       Jn Henriquez, PT   "

## 2025-05-27 ENCOUNTER — APPOINTMENT (OUTPATIENT)
Dept: PHYSICAL THERAPY | Facility: HOSPITAL | Age: 20
End: 2025-05-27
Payer: COMMERCIAL

## 2025-05-29 ENCOUNTER — TREATMENT (OUTPATIENT)
Dept: PHYSICAL THERAPY | Facility: HOSPITAL | Age: 20
End: 2025-05-29
Payer: COMMERCIAL

## 2025-05-29 DIAGNOSIS — M25.851 FEMOROACETABULAR IMPINGEMENT OF BOTH HIPS: ICD-10-CM

## 2025-05-29 DIAGNOSIS — M25.552 LEFT HIP PAIN: ICD-10-CM

## 2025-05-29 DIAGNOSIS — Z47.89 ORTHOPEDIC AFTERCARE: ICD-10-CM

## 2025-05-29 DIAGNOSIS — M25.852 FEMOROACETABULAR IMPINGEMENT OF BOTH HIPS: ICD-10-CM

## 2025-05-29 PROCEDURE — 97110 THERAPEUTIC EXERCISES: CPT | Mod: GP | Performed by: PHYSICAL THERAPIST

## 2025-05-29 PROCEDURE — 97140 MANUAL THERAPY 1/> REGIONS: CPT | Mod: GP | Performed by: PHYSICAL THERAPIST

## 2025-05-29 ASSESSMENT — PAIN - FUNCTIONAL ASSESSMENT: PAIN_FUNCTIONAL_ASSESSMENT: 0-10

## 2025-05-29 ASSESSMENT — PAIN SCALES - GENERAL: PAINLEVEL_OUTOF10: 2

## 2025-05-29 NOTE — PROGRESS NOTES
Physical Therapy  Physical Therapy Treatment Note    Patient Name: Ashley Fisher  MRN: [id number]  Today's Date: 5/29/2025  Time Calculation  Start Time: 1330  Stop Time: 1440  Time Calculation (min): 70 min    Insurance:  Visit number: 3 of 100  Authorization info: no auth  Insurance Type: [place] (no vaso)    General:  Reason for visit: right hip pain  Referred by: Dr. Moran  School: [employer]  Sport: volleyball     Current Problem  1. Femoroacetabular impingement of both hips  Follow Up In Physical Therapy      2. Left hip pain  Follow Up In Physical Therapy      3. Orthopedic aftercare  Follow Up In Physical Therapy          Precautions: none      Subjective:     Patient reports she is doing well overall. Pt states she does not have pain but is sore from her HEP    Pain  Pain Assessment: 0-10  0-10 (Numeric) Pain Score: 2    Performing HEP?: Yes      Objective:   Hip AROM (Degrees)                             (R)                    (L)  Flexion:            122*                 125                                 Extension:        20                     20                       Abduction:       45                     45                       Adduction:       NT                    NT                      ER:                    50                     40                       IR:                     70!                   40                       !=prone     Hip PROM (Degrees)                             (R)                    (L)  Flexion:            125                   125                                 Extension:        20                     20                       Abduction:       45                     45                       Adduction:       NT                    NT                      ER:                    50                     45                       IR:                     45                     45                                                        Strength Testing        Hip       "                       (R)                    (L)  Flexion:            4/5*                  5/5                                 Extension:        4/5 5/5                     Abduction:       4/5 5/5                                 ER:                    4/5 5/5                     IR:                     4/5 5/5                            Treatment Performed:    Therapeutic Exercise:    50 min  Upright bike x6'  Resisted side stepping with red TB loop 5 yards x3 each  SL fire hydrant on bench red TB loop 2x12 each   BFR 80% 30-15-15-15  S/l clamshells orange TB loop  Hook lying bridge  Fwd step up on 12\" box   90/90 core isometrics 5\"-5\"-5\" (x3)  Banded bird dog blue band 2x12 each  NT  High plank hip flexor sliders 1x10 (popping)  Hip flexor isometrics in 90/90 position 15\"on/30\" off x5  Seated resisted hip IR orange TB loop with yoga block 2x15  4\" hip hike 3x12    Manual Therapy:    10 min  STM glutes and deep rotators of the hip  STM with theragun glutes and deep rotators of the hip  STM Itband with tiger tail    Neuromuscular Re-education:   min      Other:      min  Ice x10      Assessment:   The focus of the session was strength, motor control, and stability work. The pt demonstrated good tolerance to the noted exercises today by reporting she was challneged but denied pain throughout. The pt is demonstrated good progress in skilled rehab at this time. The pt is still limited in overall strength, motor control, balance, and pain at this time. The pt continues to be a good candidate for skilled PT, in order to further improve strength, motor control, gait mechanics, and pain.       Plan:  Continue to progress glute strength. Continue wit core strength and rotational stability. STM for pain and tissue tone      Jn Henriquez, PT   "

## 2025-06-03 ENCOUNTER — TREATMENT (OUTPATIENT)
Dept: PHYSICAL THERAPY | Facility: HOSPITAL | Age: 20
End: 2025-06-03
Payer: COMMERCIAL

## 2025-06-03 DIAGNOSIS — Z47.89 ORTHOPEDIC AFTERCARE: ICD-10-CM

## 2025-06-03 DIAGNOSIS — M25.851 FEMOROACETABULAR IMPINGEMENT OF BOTH HIPS: ICD-10-CM

## 2025-06-03 DIAGNOSIS — M25.552 LEFT HIP PAIN: ICD-10-CM

## 2025-06-03 DIAGNOSIS — M25.852 FEMOROACETABULAR IMPINGEMENT OF BOTH HIPS: ICD-10-CM

## 2025-06-03 PROCEDURE — 97110 THERAPEUTIC EXERCISES: CPT | Mod: GP

## 2025-06-03 PROCEDURE — 97140 MANUAL THERAPY 1/> REGIONS: CPT | Mod: GP

## 2025-06-03 ASSESSMENT — PAIN SCALES - GENERAL: PAINLEVEL_OUTOF10: 2

## 2025-06-03 ASSESSMENT — PAIN - FUNCTIONAL ASSESSMENT: PAIN_FUNCTIONAL_ASSESSMENT: 0-10

## 2025-06-03 NOTE — PROGRESS NOTES
Physical Therapy  Physical Therapy Treatment Note    Patient Name: Ashley Fisher  MRN: [id number]  Today's Date: 6/3/2025  Time Calculation  Start Time: 0800  Stop Time: 0910  Time Calculation (min): 70 min    Insurance:  Visit number: 4 of 100  Authorization info: no auth  Insurance Type: [place] (no vaso)    General:  Reason for visit: right hip pain  Referred by: Dr. Moran  School: [employer]  Sport: volleyball     Current Problem  1. Femoroacetabular impingement of both hips  Follow Up In Physical Therapy      2. Left hip pain  Follow Up In Physical Therapy      3. Orthopedic aftercare  Follow Up In Physical Therapy            Precautions: none      Subjective:     Patient reports she has had muscle soreness after PT but no increase in hip pain. Feels she is working the muscles that need worked.     Pain       Performing HEP?: Yes      Objective:   Hip AROM (Degrees)                             (R)                    (L)  Flexion:            122*                 125                                 Extension:        20                     20                       Abduction:       45                     45                       Adduction:       NT                    NT                      ER:                    50                     40                       IR:                     70!                   40                       !=prone     Hip PROM (Degrees)                             (R)                    (L)  Flexion:            125                   125                                 Extension:        20                     20                       Abduction:       45                     45                       Adduction:       NT                    NT                      ER:                    50                     45                       IR:                     45                     45                                                        Strength Testing        Hip                       "       (R)                    (L)  Flexion:            4/5*                  5/5                                 Extension:        4/5 5/5                     Abduction:       4/5 5/5                                 ER:                    4/5 5/5                     IR:                     4/5 5/5                            Treatment Performed:    Therapeutic Exercise:    50 min  Upright bike x6'  Resisted side stepping with red TB loop 5 yards x3 each  Monster walks red TB 5 yards 3 x each   SL fire hydrant on bench red TB loop 2x12 each   Plank hip extension 3 x 5   Side plank modified hip abd 3 x 20\" hold B  BFR 80% 30-15-15-15  S/l hip abduction  Hook lying bridge  Lateral step up 8\"  NT  90/90 core isometrics 5\"-5\"-5\" (x3)  Banded bird dog blue band 2x12 each  NT  High plank hip flexor sliders 1x10 (popping)  Hip flexor isometrics in 90/90 position 15\"on/30\" off x5  Seated resisted hip IR orange TB loop with yoga block 2x15  4\" hip hike 3x12    Manual Therapy:    10 min  STM glutes and deep rotators of the hip  Myofascial release R ITB     Neuromuscular Re-education:   min      Other:      min  Ice x10      Assessment:   The focus of today's session was strengthening and proprioception training . Patient appropriately challenged by therapeutic exercise and was able to complete without increased pain. The patient is still limited in overall strength, flexibility, motor control and pain  at this time. Patient progressing well overall with therapy and continues to require skilled care to address motor control, strength, flexibility and functional deficits.         Plan:  Continue to progress glute strength. Continue wit core strength and rotational stability. STM for pain and tissue tone      Shannon Gaines, PT   "

## 2025-06-05 ENCOUNTER — TREATMENT (OUTPATIENT)
Dept: PHYSICAL THERAPY | Facility: HOSPITAL | Age: 20
End: 2025-06-05
Payer: COMMERCIAL

## 2025-06-05 DIAGNOSIS — M25.552 LEFT HIP PAIN: ICD-10-CM

## 2025-06-05 DIAGNOSIS — Z47.89 ORTHOPEDIC AFTERCARE: ICD-10-CM

## 2025-06-05 DIAGNOSIS — M25.852 FEMOROACETABULAR IMPINGEMENT OF BOTH HIPS: Primary | ICD-10-CM

## 2025-06-05 DIAGNOSIS — M25.851 FEMOROACETABULAR IMPINGEMENT OF BOTH HIPS: Primary | ICD-10-CM

## 2025-06-05 PROCEDURE — 97110 THERAPEUTIC EXERCISES: CPT | Mod: GP

## 2025-06-05 PROCEDURE — 97140 MANUAL THERAPY 1/> REGIONS: CPT | Mod: GP

## 2025-06-05 NOTE — PROGRESS NOTES
Physical Therapy  Physical Therapy Treatment Note    Patient Name: Ashley Fisher  MRN: [id number]  Today's Date: 6/5/2025  Time Calculation  Start Time: 0830  Stop Time: 0940  Time Calculation (min): 70 min    Insurance:  Visit number: 5 of 100  Authorization info: no auth  Insurance Type: [place] (no vaso)    General:  Reason for visit: right hip pain  Referred by: Dr. Moran  School: [employer]  Sport: volleyball     Current Problem  1. Femoroacetabular impingement of both hips  Follow Up In Physical Therapy      2. Left hip pain  Follow Up In Physical Therapy      3. Orthopedic aftercare  Follow Up In Physical Therapy          Precautions: none      Subjective:     Patient reports she has some anterior hip pain today, thinks it was from the bridges. Patient has a follow up with MD next week. Pt does not have any more PT scheduled at this point.     Pain       Performing HEP?: Yes      Objective:   Hip AROM (Degrees)                             (R)                    (L)  Flexion:            122*                 125                                 Extension:        20                     20                       Abduction:       45                     45                       Adduction:       NT                    NT                      ER:                    50                     40                       IR:                     70!                   40                       !=prone     Hip PROM (Degrees)                             (R)                    (L)  Flexion:            125                   125                                 Extension:        20                     20                       Abduction:       45                     45                       Adduction:       NT                    NT                      ER:                    50                     45                       IR:                     45                     45                                                         "Strength Testing        Hip                             (R)                    (L)  Flexion:            4/5*                  5/5                                 Extension:        4/5 5/5                     Abduction:       4/5 5/5                                 ER:                    4/5 5/5                     IR:                     4/5 5/5                            Treatment Performed:    Therapeutic Exercise:    50 min  Upright bike x6'  Resisted side stepping with red TB loop 5 yards x3 each  Monster walks red TB 5 yards 3 x each   SL fire hydrant on bench red TB loop 2x12 each   Plank 3 x 30\"   Side plank clams 3 x 10   Quad ball at wall 2 x 10 each   SL squat on jump  level 4 4 x 8   Foam rolling gluteals  Piriformis stretch 3 x 30\"     NT   Glute med hip abd at wall  BFR 80% 30-15-15-15  S/L hip abduction  Hook lying bridge  Lateral step up 8\"  NT  90/90 core isometrics 5\"-5\"-5\" (x3)  Banded bird dog blue band 2x12 each  NT  Hip flexor isometrics in 90/90 position 15\"on/30\" off x5  Seated resisted hip IR orange TB loop with yoga block 2x15  4\" hip hike 3x12    Manual Therapy:    10 min  STM glutes and deep rotators of the hip  Myofascial release R ITB     Neuromuscular Re-education:   min      Other:      min  Ice x10      Assessment:   Discussed plan of care going forward with patient- at this point she feels confident with her HEP due to the amount of PT she has had this year. Considering her deep hip pain is about the same, she is going to proceed with an MRI if MD recommends. Patient will follow up in a few weeks to assess progress with HEP.         Plan:  Continue to progress glute strength. Continue wit core strength and rotational stability as able. Follow up in a few weeks.       Shannon Gaines, PT   "

## 2025-06-06 ENCOUNTER — APPOINTMENT (OUTPATIENT)
Dept: ORTHOPEDIC SURGERY | Facility: HOSPITAL | Age: 20
End: 2025-06-06
Payer: COMMERCIAL

## 2025-06-11 ENCOUNTER — OFFICE VISIT (OUTPATIENT)
Dept: ORTHOPEDIC SURGERY | Facility: HOSPITAL | Age: 20
End: 2025-06-11
Payer: COMMERCIAL

## 2025-06-11 DIAGNOSIS — M25.851 FEMOROACETABULAR IMPINGEMENT OF RIGHT HIP: Primary | ICD-10-CM

## 2025-06-11 DIAGNOSIS — M25.852 FEMOROACETABULAR IMPINGEMENT OF BOTH HIPS: ICD-10-CM

## 2025-06-11 DIAGNOSIS — M25.851 FEMOROACETABULAR IMPINGEMENT OF BOTH HIPS: ICD-10-CM

## 2025-06-11 PROCEDURE — 99212 OFFICE O/P EST SF 10 MIN: CPT | Performed by: PHYSICIAN ASSISTANT

## 2025-06-11 PROCEDURE — 99213 OFFICE O/P EST LOW 20 MIN: CPT | Performed by: PHYSICIAN ASSISTANT

## 2025-06-11 NOTE — PROGRESS NOTES
Patient is here today for follow up on both of her hips.  She is now over 6 months out from her left hip scope.  She is doing great and is happy with her outcome.    Her right hip has continued to bother her.  She has been doing physical therapy over the past 6 months on this right hip as well but pain has continued to worsen. It affects her ADLs.   Sharp groin pain worse with sitting.  Positive FADIR on exam.    She has an x-ray that confirms MELINA on the right side as well, acetabular index of 4.5, LCEA of 27 and alpha angle of 65.   She is interested in operative intervention on this right hip as well.  WE will plan to get an arthrogram of the hip to evaluate the labrum as she has continued pain despite activity modification, NSAIDs and formal PT over the last 6 months.  She will follow up with Dr. Moran in July before she returns to school to discuss surgical intervention.         Faith Hernandez PA-C

## 2025-06-17 LAB
ERYTHROCYTE [DISTWIDTH] IN BLOOD BY AUTOMATED COUNT: 12.5 % (ref 11–15)
FERRITIN SERPL-MCNC: 12 NG/ML (ref 16–154)
HCT VFR BLD AUTO: 45.7 % (ref 35–45)
HGB BLD-MCNC: 15.1 G/DL (ref 11.7–15.5)
MCH RBC QN AUTO: 29.9 PG (ref 27–33)
MCHC RBC AUTO-ENTMCNC: 33 G/DL (ref 32–36)
MCV RBC AUTO: 90.5 FL (ref 80–100)
PLATELET # BLD AUTO: 141 THOUSAND/UL (ref 140–400)
PMV BLD REES-ECKER: 12.1 FL (ref 7.5–12.5)
RBC # BLD AUTO: 5.05 MILLION/UL (ref 3.8–5.1)
WBC # BLD AUTO: 5.7 THOUSAND/UL (ref 3.8–10.8)

## 2025-06-18 ENCOUNTER — PATIENT MESSAGE (OUTPATIENT)
Dept: PEDIATRICS | Facility: CLINIC | Age: 20
End: 2025-06-18
Payer: COMMERCIAL

## 2025-06-27 ENCOUNTER — APPOINTMENT (OUTPATIENT)
Dept: PEDIATRICS | Facility: CLINIC | Age: 20
End: 2025-06-27
Payer: COMMERCIAL

## 2025-06-30 ENCOUNTER — APPOINTMENT (OUTPATIENT)
Dept: PEDIATRICS | Facility: CLINIC | Age: 20
End: 2025-06-30
Payer: COMMERCIAL

## 2025-06-30 VITALS
DIASTOLIC BLOOD PRESSURE: 79 MMHG | HEIGHT: 69 IN | WEIGHT: 157.2 LBS | HEART RATE: 89 BPM | BODY MASS INDEX: 23.28 KG/M2 | SYSTOLIC BLOOD PRESSURE: 133 MMHG

## 2025-06-30 DIAGNOSIS — E61.1 IRON DEFICIENCY: ICD-10-CM

## 2025-06-30 DIAGNOSIS — Z00.00 WELL ADULT EXAM: Primary | ICD-10-CM

## 2025-06-30 PROCEDURE — 99395 PREV VISIT EST AGE 18-39: CPT | Performed by: PEDIATRICS

## 2025-06-30 PROCEDURE — 3008F BODY MASS INDEX DOCD: CPT | Performed by: PEDIATRICS

## 2025-06-30 PROCEDURE — 96127 BRIEF EMOTIONAL/BEHAV ASSMT: CPT | Performed by: PEDIATRICS

## 2025-06-30 ASSESSMENT — PATIENT HEALTH QUESTIONNAIRE - PHQ9
8. MOVING OR SPEAKING SO SLOWLY THAT OTHER PEOPLE COULD HAVE NOTICED. OR THE OPPOSITE, BEING SO FIGETY OR RESTLESS THAT YOU HAVE BEEN MOVING AROUND A LOT MORE THAN USUAL: NOT AT ALL
9. THOUGHTS THAT YOU WOULD BE BETTER OFF DEAD, OR OF HURTING YOURSELF: NOT AT ALL
3. TROUBLE FALLING OR STAYING ASLEEP OR SLEEPING TOO MUCH: NOT AT ALL
6. FEELING BAD ABOUT YOURSELF - OR THAT YOU ARE A FAILURE OR HAVE LET YOURSELF OR YOUR FAMILY DOWN: NOT AT ALL
2. FEELING DOWN, DEPRESSED OR HOPELESS: NOT AT ALL
7. TROUBLE CONCENTRATING ON THINGS, SUCH AS READING THE NEWSPAPER OR WATCHING TELEVISION: NOT AT ALL
4. FEELING TIRED OR HAVING LITTLE ENERGY: SEVERAL DAYS
10. IF YOU CHECKED OFF ANY PROBLEMS, HOW DIFFICULT HAVE THESE PROBLEMS MADE IT FOR YOU TO DO YOUR WORK, TAKE CARE OF THINGS AT HOME, OR GET ALONG WITH OTHER PEOPLE: NOT DIFFICULT AT ALL
1. LITTLE INTEREST OR PLEASURE IN DOING THINGS: NOT AT ALL
7. TROUBLE CONCENTRATING ON THINGS, SUCH AS READING THE NEWSPAPER OR WATCHING TELEVISION: NOT AT ALL
5. POOR APPETITE OR OVEREATING: NOT AT ALL
1. LITTLE INTEREST OR PLEASURE IN DOING THINGS: NOT AT ALL
5. POOR APPETITE OR OVEREATING: NOT AT ALL
8. MOVING OR SPEAKING SO SLOWLY THAT OTHER PEOPLE COULD HAVE NOTICED. OR THE OPPOSITE - BEING SO FIDGETY OR RESTLESS THAT YOU HAVE BEEN MOVING AROUND A LOT MORE THAN USUAL: NOT AT ALL
6. FEELING BAD ABOUT YOURSELF - OR THAT YOU ARE A FAILURE OR HAVE LET YOURSELF OR YOUR FAMILY DOWN: NOT AT ALL
2. FEELING DOWN, DEPRESSED OR HOPELESS: NOT AT ALL
4. FEELING TIRED OR HAVING LITTLE ENERGY: SEVERAL DAYS
SUM OF ALL RESPONSES TO PHQ QUESTIONS 1-9: 1
SUM OF ALL RESPONSES TO PHQ9 QUESTIONS 1 & 2: 0
3. TROUBLE FALLING OR STAYING ASLEEP: NOT AT ALL
10. IF YOU CHECKED OFF ANY PROBLEMS, HOW DIFFICULT HAVE THESE PROBLEMS MADE IT FOR YOU TO DO YOUR WORK, TAKE CARE OF THINGS AT HOME, OR GET ALONG WITH OTHER PEOPLE: NOT DIFFICULT AT ALL
9. THOUGHTS THAT YOU WOULD BE BETTER OFF DEAD, OR OF HURTING YOURSELF: NOT AT ALL

## 2025-06-30 NOTE — PROGRESS NOTES
Subjective     Ashley Fisher is here for her annual well visit.    Current Issues:  Questions or concerns:  Currently taking iron for low ferritin.  No symptoms.    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  General:  Well-appearing  Well-hydrated  No acute distress   Head:  Normocephalic   Eyes:  Lids and conjunctivae normal  Sclerae white  Pupils equal and reactive   ENT:  Ears:  TMs normal bilaterally  Mouth:  mucosa moist; no visible lesions  Throat:  OP moist and clear; uvula midline  Neck:  supple; no thyroid enlargement   Respiratory:  Respiratory rate:  normal  Air exchange:  normal   Adventitious breath sounds:  none  Accessory muscle use:  none   Heart:  Rate and rhythm:  regular  Murmur:  none    Abdomen:  Palpation:  soft, non-tender, non-distended, no masses  Organs:  no HSM  Bowel sounds:  normal   :  Normal external genitalia   MSK: Range of motion:  grossly normal in all joints  Swelling:  none  Muscle bulk and strength:  grossly normal   Skin:  Warm and well-perfused  No rashes   Lymphatic: No nodes larger than 1 cm palpated  No firm or fixed nodes palpated   Neuro:  Alert  Moves all extremities spontaneously  CN:  grossly intact  Tone:  normal      Assessment/Plan     - Anticipatory guidance regarding development, safety, nutrition, physical activity, and sleep reviewed.  - Growth:  appropriate for age  - Development:  active and social   - Social:  Appropriate for age.  Confidential questionnaire reviewed and discussed.  Age appropriate anticipatory guidance given.  - Vaccines:  as documented  - Return in 1 year for annual well exam or sooner if concerns arise.  -Recommended scheduling a penicillin challenge in our office  -Repeat labs after iron course in the next few months

## 2025-07-16 ENCOUNTER — HOSPITAL ENCOUNTER (OUTPATIENT)
Dept: RADIOLOGY | Facility: HOSPITAL | Age: 20
Discharge: HOME | End: 2025-07-16
Payer: COMMERCIAL

## 2025-07-16 DIAGNOSIS — M25.851 FEMOROACETABULAR IMPINGEMENT OF RIGHT HIP: ICD-10-CM

## 2025-07-16 PROCEDURE — 73722 MRI JOINT OF LWR EXTR W/DYE: CPT | Mod: RIGHT SIDE | Performed by: RADIOLOGY

## 2025-07-16 PROCEDURE — 2550000001 HC RX 255 CONTRASTS: Performed by: PHYSICIAN ASSISTANT

## 2025-07-16 PROCEDURE — 2500000004 HC RX 250 GENERAL PHARMACY W/ HCPCS (ALT 636 FOR OP/ED)

## 2025-07-16 PROCEDURE — 77002 NEEDLE LOCALIZATION BY XRAY: CPT | Mod: RIGHT SIDE | Performed by: RADIOLOGY

## 2025-07-16 PROCEDURE — 27093 INJECTION FOR HIP X-RAY: CPT | Mod: RT

## 2025-07-16 PROCEDURE — 73722 MRI JOINT OF LWR EXTR W/DYE: CPT | Mod: RT

## 2025-07-16 PROCEDURE — 27093 INJECTION FOR HIP X-RAY: CPT | Mod: RIGHT SIDE | Performed by: RADIOLOGY

## 2025-07-16 PROCEDURE — A9575 INJ GADOTERATE MEGLUMI 0.1ML: HCPCS | Mod: JW | Performed by: PHYSICIAN ASSISTANT

## 2025-07-16 RX ORDER — LIDOCAINE HYDROCHLORIDE 10 MG/ML
6 INJECTION, SOLUTION EPIDURAL; INFILTRATION; INTRACAUDAL; PERINEURAL ONCE
Status: CANCELLED | OUTPATIENT
Start: 2025-07-16 | End: 2025-07-16

## 2025-07-16 RX ORDER — GADOTERATE MEGLUMINE 376.9 MG/ML
0.1 INJECTION INTRAVENOUS
Status: COMPLETED | OUTPATIENT
Start: 2025-07-16 | End: 2025-07-16

## 2025-07-16 RX ORDER — LIDOCAINE HYDROCHLORIDE 10 MG/ML
INJECTION, SOLUTION INFILTRATION; PERINEURAL
Status: COMPLETED
Start: 2025-07-16 | End: 2025-07-16

## 2025-07-16 RX ADMIN — GADOTERATE MEGLUMINE 0.1 ML: 376.9 INJECTION INTRAVENOUS at 13:34

## 2025-07-16 RX ADMIN — LIDOCAINE HYDROCHLORIDE 6 ML: 10 INJECTION, SOLUTION INFILTRATION; PERINEURAL at 13:35

## 2025-07-16 RX ADMIN — IOHEXOL 6 ML: 240 INJECTION, SOLUTION INTRATHECAL; INTRAVASCULAR; INTRAVENOUS; ORAL at 13:35

## 2025-07-16 NOTE — POST-PROCEDURE NOTE
Interventional Radiology Brief Postprocedure Note    Attending: Carine Plasencia MD      Assistant: n/a    Diagnosis: MELINA    Description of procedure: The risks, benefits and alternatives of the procedure were discussed  with the patient. The patient was given the chance to ask questions,  and all were answered prior to proceeding. At this time, written  informed consent was obtained.      The patient was placed in the supine position on the fluoroscopic  table and was prepped and draped in the usual sterile fashion. The  right hip joint was localized under fluoroscopy. Lidocaine was used  for local anesthesia. Under fluoroscopic guidance a 20 gauge needle  was inserted into the right hip joint. Approximately 6 mL of a  solution containing lidocaine, Omnipaque 240 iodinated contrast and  Multihance gadolinium contrast was injected into the right hip joint.      The patient tolerated the procedure well and no immediate  complication was noted.        Anesthesia:  Local    Complications: None    Estimated Blood Loss: none    Medications (Filter: Administrations occurring from 1405 to 1405 on 07/16/25) As of 07/16/25 1405      None          No specimens collected      See detailed result report with images in PACS.    The patient tolerated the procedure well without incident or complication and is in stable condition.

## 2025-07-23 ENCOUNTER — OFFICE VISIT (OUTPATIENT)
Dept: ORTHOPEDIC SURGERY | Facility: HOSPITAL | Age: 20
End: 2025-07-23
Payer: COMMERCIAL

## 2025-07-23 DIAGNOSIS — S73.191A ACETABULAR LABRUM TEAR, RIGHT, INITIAL ENCOUNTER: Primary | ICD-10-CM

## 2025-07-23 PROCEDURE — 99213 OFFICE O/P EST LOW 20 MIN: CPT | Performed by: ORTHOPAEDIC SURGERY

## 2025-07-23 PROCEDURE — 99212 OFFICE O/P EST SF 10 MIN: CPT | Performed by: ORTHOPAEDIC SURGERY

## 2025-07-23 RX ORDER — ECONAZOLE NITRATE 10 MG/G
1 CREAM TOPICAL 2 TIMES DAILY
COMMUNITY
Start: 2025-07-15

## 2025-07-23 ASSESSMENT — PAIN SCALES - GENERAL: PAINLEVEL_OUTOF10: 2

## 2025-07-23 ASSESSMENT — PAIN - FUNCTIONAL ASSESSMENT: PAIN_FUNCTIONAL_ASSESSMENT: 0-10

## 2025-07-23 NOTE — PROGRESS NOTES
HPI  This is a pleasant 20 y.o. female here today for right hip pain.  Patient has previously undergone a successful left-sided hip arthroscopy with labral debridement femoral osteochondral plasty capsular plication and is doing very well on that side.  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  We have talked to her about her options and she has been doing significant rehabilitation on the right side as well over the last year to 6 months and has not had significant improvement in her right sided symptoms this is persisted despite physical therapy anti-inflammatory and activity modifications.  Based on her failure to respond to conservative management an MRI scan was obtained that shows a tear of her labrum on her right side that is very consistent with what she had on her left.        Medical History[1]    Surgical History[2]    Social History[3]       ROS  Review of systems reviewed and pertinent positives mentioned in HPI.      PHYSICAL EXAM  There is not  pain with a resisted situp.    There is not abdominal distention or tenderness    The patient's range of motion reveals that they have 90° of hip flexion on the affected side.   ER 25 degrees.   IR 15 degrees  Hip extension to 10°   Abduction to 45°      The patient is 5 out of 5 strength with resisted hip AB, adduction, hamstring and quadriceps testing.    No pain over the hip flexor, ASIS.  No pain over the proximal hamstring, piriformis      Pain Provacation testing:    Positive impingement sign,with a painful arc from 12 to 3:00.  Negative Psoas impingement/Consuelo test  Negative instability, Log roll.  Positive subspine impingement test, which is pain with straight hip flexion.    Negative straight leg raise.  Negative circumduction clunk.      Peritrochanteric space examination:  Tenderness over the joe-trochanteric space - No  pain over the posterior trochanter -  No      IMAGING  X-rays reviewed reveal no gross fracture or dislocation. and evidence of femoral acetabular impingement with an alpha angle of 68.  Acetabular index of 4  with acetabular retroversion.  Lateral center edge of 25.    MRI reviewed reveals tearing of the acetabular labrum and an intra-articular cartilaginous loose body      ASSESSMENT/PLAN  This is a 20 y.o. female patient here today with significant hip pain secondary to Right femoral acetabular impingement    Patient has exhausted conservative management including therapeutic exercises, activity modification, NSAIDS.  They continue to have worsening deep groin pain with mechanical symptoms affecting ADLs.  Patient would like to proceed with surgery entailing a hip arthroscopy, acetabuloplasty for acetabular retroversion, labral repair, femoroplasty, loose body removal for possible cartilaginous loose body seen on MRI, and capsular plication for mild hip instability.    We discussed the risks and benefits of surgery which included but were not limited to bleeding, infection, damage to nerves, damage to blood vessels, need for further procedures, risks of anesthesia, heterotopic ossification, femoral neck stress fracture, avascular necrosis of the femoral head, pudendal nerve palsy iatrogenic instability progression of osteoarthritis.    Patient was prescribed a hinged hip brace for MELINA/labral tear.  The patient is ambulatory with or without aid; but, has weakness, instability and/or deformity of their right hip which requires stabilization from this orthosis to improve their function.      Verbal and written instructions for the use, wear schedule, cleaning and application of this item were given.  Patient was instructed that should the brace result in increased pain, decreased sensation, increased swelling, or an overall worsening of their medical condition, to please contact our office immediately.     Patient was prescribed crutches for MELINA/labral  tear.  This mobility device is required for the following reasons:    1. The patient has a mobility limitation that significantly impairs their ability to participate in one or more mobility-related activities of daily living (MRADL) in the home; and  2. The patient is able to safely use the mobility device; and  3. The functional mobility deficit can be sufficiently resolved with use of the mobility device    Verbal and written instructions for the use, wear schedule, cleaning and application of this item were given.  Education provided also included gait training and safety precautions when using this device. Patient was instructed that should the item result in increased pain, decreased sensation, increased swelling, or an overall worsening of their medical condition, to please contact our office immediately.    Orthotic management and training was provided for skin care, modifications due to healing tissues, edema changes, interruption in skin integrity, and safety precautions with the orthosis.           Francesco Moran MD                      [1]   Past Medical History:  Diagnosis Date    Acute pharyngitis, unspecified 02/25/2017    Sore throat    Cough 06/20/2023    Dry eye syndrome 06/20/2023    Dry eyes 06/20/2023    Finger injury, initial encounter 06/20/2023    Influenza 06/20/2023    Keratoconjunctivitis 06/20/2023   [2]   Past Surgical History:  Procedure Laterality Date    CHALAZION EXCISION      OTHER SURGICAL HISTORY  06/05/2020    No history of surgery    WISDOM TOOTH EXTRACTION     [3]   Social History  Tobacco Use    Smoking status: Never    Smokeless tobacco: Never   Vaping Use    Vaping status: Never Used   Substance Use Topics    Alcohol use: Not Currently    Drug use: Never

## 2025-07-25 PROBLEM — M25.851 FEMOROACETABULAR IMPINGEMENT OF RIGHT HIP: Status: ACTIVE | Noted: 2025-07-23

## 2025-07-25 PROBLEM — S73.191A TEAR OF RIGHT ACETABULAR LABRUM: Status: ACTIVE | Noted: 2025-07-23

## 2025-07-25 PROBLEM — M24.051 LOOSE BODY IN RIGHT HIP: Status: ACTIVE | Noted: 2025-07-23

## (undated) DEVICE — BLADE, GREAT WHITE CONCAVE, 4.2MM, PREBENT

## (undated) DEVICE — GOWN, SURGICAL, SMARTGOWN, XLARGE, STERILE

## (undated) DEVICE — TUBING, SUCTION, 9 FT, STERILE, CLEAR

## (undated) DEVICE — BUR, SPHERICAL, 5.5MM, PREBENT

## (undated) DEVICE — GLOVE, SURGICAL, PROTEXIS PI W/NEU-THERA, 8.5, PF, LF

## (undated) DEVICE — SOLUTION, IRRI GATION, LACTATED RINGERS, 3000 ML, BAG

## (undated) DEVICE — CANNULA, FLOWPORT II, WITH OBTURATOR

## (undated) DEVICE — BUR, SPHERICAL, 4.5MM, PREBENT

## (undated) DEVICE — KIT, HIP POSTFREE, MEDIUM, GUARDIAN

## (undated) DEVICE — ARTHROWAND, MULTIVAC 50XL, ICW

## (undated) DEVICE — SUTURE TAPE, XBRAID, 1.4MM BLACK/WHITE

## (undated) DEVICE — GLOVE, SURGICAL, PROTEXIS PI MICRO, 8.0, PF, LF

## (undated) DEVICE — DRILL, ICONIX, 1.4MM, DISPOSABLE

## (undated) DEVICE — Device

## (undated) DEVICE — SYRINGE, 60 CC, IRRIGATION, BULB, CONTRO-BULB, PAPER POUCH

## (undated) DEVICE — CATHETER TRAY, FOLEY, 18FR, 10ML, W/ DRAINBAG

## (undated) DEVICE — TUBING, NFLOW, FMS VUE, SNGL USE, DISP, LF

## (undated) DEVICE — SUTURE MANAGER, NANOPASSER, CRESCENT

## (undated) DEVICE — ADVANCED MEDICAL DESIGNS C-ARM PACK, CLIP ON C-ARM DRAPE, 20IN X 20IN FOOT SWITCH DRAPE, 36IN X 36IN SNAP KOVER

## (undated) DEVICE — SYRINGE, 10 CC, LUER LOCK

## (undated) DEVICE — DRAPE, INSTRUMENT, W/POUCH, STERI DRAPE, 9 5/8 X 18 LONG

## (undated) DEVICE — SLINGSHOT, 45 DEG UP

## (undated) DEVICE — GLOVE, SURGICAL, PROTEXIS PI MICRO, 6.5, PF, LF

## (undated) DEVICE — 125IN X 83IN STERI-DRAPE ISOLATION DRAPE WITH IOBAN 2 INCISE FILM AND POUCH 6617

## (undated) DEVICE — SUTURE, ETHILON, 3-0, 18 IN, PS1, BLACK

## (undated) DEVICE — GLOVE, SURGICAL, PROTEXIS PI BLUE W/NEUTHERA, 7.0, PF, LF

## (undated) DEVICE — TUBING, OUTFLOW, DRAIN PIPE, W/ONE WAY VALVE (FMS VUE)

## (undated) DEVICE — CONTAINER, SPECIMEN, 4 OZ, OR PEEL PACK, STERILE

## (undated) DEVICE — ENTRY KIT, PORTAL

## (undated) DEVICE — BLADE, ARTHRO-LOK, BANANA, SHARP ALL AROUND, 4 MM